# Patient Record
Sex: FEMALE | Race: WHITE | NOT HISPANIC OR LATINO | Employment: OTHER | URBAN - NONMETROPOLITAN AREA
[De-identification: names, ages, dates, MRNs, and addresses within clinical notes are randomized per-mention and may not be internally consistent; named-entity substitution may affect disease eponyms.]

---

## 2017-02-21 ENCOUNTER — TELEPHONE (OUTPATIENT)
Dept: SURGERY | Facility: CLINIC | Age: 57
End: 2017-02-21

## 2017-03-06 ENCOUNTER — TELEPHONE (OUTPATIENT)
Dept: SURGERY | Facility: CLINIC | Age: 57
End: 2017-03-06

## 2017-03-06 ENCOUNTER — OFFICE VISIT (OUTPATIENT)
Dept: SURGERY | Facility: CLINIC | Age: 57
End: 2017-03-06

## 2017-03-06 VITALS
DIASTOLIC BLOOD PRESSURE: 90 MMHG | HEIGHT: 62 IN | RESPIRATION RATE: 16 BRPM | BODY MASS INDEX: 30 KG/M2 | SYSTOLIC BLOOD PRESSURE: 158 MMHG | HEART RATE: 72 BPM | WEIGHT: 163 LBS

## 2017-03-06 DIAGNOSIS — K22.70 BARRETT'S ESOPHAGUS WITHOUT DYSPLASIA: Primary | ICD-10-CM

## 2017-03-06 DIAGNOSIS — R13.19 OTHER DYSPHAGIA: ICD-10-CM

## 2017-03-06 DIAGNOSIS — R10.13 EPIGASTRIC ABDOMINAL PAIN: ICD-10-CM

## 2017-03-06 PROCEDURE — 99214 OFFICE O/P EST MOD 30 MIN: CPT | Performed by: SURGERY

## 2017-03-06 RX ORDER — SODIUM CHLORIDE 0.9 % (FLUSH) 0.9 %
1-10 SYRINGE (ML) INJECTION AS NEEDED
Status: CANCELLED | OUTPATIENT
Start: 2017-03-06

## 2017-03-06 RX ORDER — OMEPRAZOLE 40 MG/1
40 CAPSULE, DELAYED RELEASE ORAL DAILY
COMMUNITY

## 2017-03-06 RX ORDER — DOXEPIN HYDROCHLORIDE 10 MG/1
10 CAPSULE ORAL NIGHTLY
COMMUNITY

## 2017-03-06 RX ORDER — LORATADINE 10 MG/1
CAPSULE, LIQUID FILLED ORAL
COMMUNITY

## 2017-03-06 RX ORDER — IBUPROFEN 200 MG
TABLET ORAL EVERY 6 HOURS PRN
COMMUNITY
End: 2019-11-05 | Stop reason: HOSPADM

## 2017-03-06 RX ORDER — HYDROCODONE BITARTRATE AND ACETAMINOPHEN 10; 325 MG/1; MG/1
1 TABLET ORAL EVERY 6 HOURS PRN
COMMUNITY

## 2017-03-06 RX ORDER — LIDOCAINE HYDROCHLORIDE 10 MG/ML
0.1 INJECTION, SOLUTION EPIDURAL; INFILTRATION; INTRACAUDAL; PERINEURAL ONCE AS NEEDED
Status: CANCELLED | OUTPATIENT
Start: 2017-03-06

## 2017-03-06 RX ORDER — LAMOTRIGINE 200 MG/1
200 TABLET ORAL DAILY
COMMUNITY

## 2017-03-06 RX ORDER — BUTALBITAL, ASPIRIN AND CAFFEINE 50; 325; 40 MG/1; MG/1; MG/1
1 TABLET ORAL EVERY 4 HOURS PRN
COMMUNITY
End: 2019-10-09

## 2017-03-06 RX ORDER — ARIPIPRAZOLE 2 MG/1
2 TABLET ORAL DAILY
COMMUNITY

## 2017-03-06 NOTE — H&P
Amina Petersen 56 y.o. female presents to discuss repeat EGD.  + H/O Nava's Esoph. PCP ASHWIN Verde      HPI     Above noted and agree.  Amina has been having issues with trouble swallowing and epigastric pain.  She has heartburn.  She has lost 6 pounds in the last 4 weeks.  She has constipation alternating with diarrhea.  She had a colonoscopy last year and is not due for a repeat colonoscopy until .  She denies chest pain.  She denies shortness of breath.  She has no fevers or chills.    Review of Systems   All other systems reviewed and are negative.          Past Medical History   Diagnosis Date   • Anemia    • Anxiety    • Bipolar I disorder, single manic episode    • Blocked brachial branch artery    • Bundle branch block    • Depression    • Gastric ulcer    • Hyperlipidemia    • Hypertension    • Low back pain    • Migraine headache    • Osteoarthritis    • Skin cancer            Past Surgical History   Procedure Laterality Date   •  section     • Hysterectomy     • Neuroplasty       neuroplasty median nerve at carpal tunnel   • Tonsillectomy     • Tubal abdominal ligation     • Knee arthroscopy Right    • Endoscopy N/A 3/8/2016     Procedure: ESOPHAGOGASTRODUODENOSCOPY with biopsy for kaleb and GE junction using cold forceps;  Surgeon: Cassandra Lagos DO;  Location: [facility] OR;  Service:    • Colonoscopy N/A 3/8/2016     Procedure: COLONOSCOPY with polypectomy of polyps with cold forceps;  Surgeon: Cassandra Lagos DO;  Location: [facility] OR;  Service:            Physical Exam   Constitutional: She is oriented to person, place, and time. She appears well-developed and well-nourished.   HENT:   Head: Normocephalic and atraumatic.   Neck: Neck supple.   Cardiovascular: Normal rate and regular rhythm.    Pulmonary/Chest: Effort normal and breath sounds normal.   Abdominal: Soft. Bowel sounds are normal.   Musculoskeletal: She exhibits no edema.   Neurological: She is alert and oriented to  "person, place, and time.   Skin: Skin is warm and dry.   Psychiatric: She has a normal mood and affect. Her behavior is normal.   Nursing note and vitals reviewed.          Visit Vitals   • /90   • Pulse 72   • Resp 16   • Ht 62\" (157.5 cm)   • Wt 163 lb (73.9 kg)   • BMI 29.81 kg/m2           Amina was seen today for egd.    Diagnoses and all orders for this visit:    Nava's esophagus without dysplasia    Epigastric abdominal pain    Other dysphagia    We will schedule Amina for an EGD.  I discussed with the patient the benefits and risks of performing endoscopy.  Benefits and risks were not limited to but including bleeding, infection, perforation, complications of anesthesia, aspiration.  The patient appeared to understand and is willing to proceed.    Thank you for allowing me to participate in the care of this interesting patient.        "

## 2017-03-06 NOTE — PROGRESS NOTES
Amina Petersen 56 y.o. female presents to discuss repeat EGD.  + H/O Nava's Esoph. PCP ASHWIN Verde      HPI     Above noted and agree.  Amina has been having issues with trouble swallowing and epigastric pain.  She has heartburn.  She has lost 6 pounds in the last 4 weeks.  She has constipation alternating with diarrhea.  She had a colonoscopy last year and is not due for a repeat colonoscopy until .  She denies chest pain.  She denies shortness of breath.  She has no fevers or chills.    Review of Systems   All other systems reviewed and are negative.          Past Medical History   Diagnosis Date   • Anemia    • Anxiety    • Bipolar I disorder, single manic episode    • Blocked brachial branch artery    • Bundle branch block    • Depression    • Gastric ulcer    • Hyperlipidemia    • Hypertension    • Low back pain    • Migraine headache    • Osteoarthritis    • Skin cancer            Past Surgical History   Procedure Laterality Date   •  section     • Hysterectomy     • Neuroplasty       neuroplasty median nerve at carpal tunnel   • Tonsillectomy     • Tubal abdominal ligation     • Knee arthroscopy Right    • Endoscopy N/A 3/8/2016     Procedure: ESOPHAGOGASTRODUODENOSCOPY with biopsy for kaleb and GE junction using cold forceps;  Surgeon: Cassandra Lagos DO;  Location: [facility] OR;  Service:    • Colonoscopy N/A 3/8/2016     Procedure: COLONOSCOPY with polypectomy of polyps with cold forceps;  Surgeon: Cassandra Lagos DO;  Location: [facility] OR;  Service:            Physical Exam   Constitutional: She is oriented to person, place, and time. She appears well-developed and well-nourished.   HENT:   Head: Normocephalic and atraumatic.   Neck: Neck supple.   Cardiovascular: Normal rate and regular rhythm.    Pulmonary/Chest: Effort normal and breath sounds normal.   Abdominal: Soft. Bowel sounds are normal.   Musculoskeletal: She exhibits no edema.   Neurological: She is alert and oriented to  "person, place, and time.   Skin: Skin is warm and dry.   Psychiatric: She has a normal mood and affect. Her behavior is normal.   Nursing note and vitals reviewed.          Visit Vitals   • /90   • Pulse 72   • Resp 16   • Ht 62\" (157.5 cm)   • Wt 163 lb (73.9 kg)   • BMI 29.81 kg/m2           Amina was seen today for egd.    Diagnoses and all orders for this visit:    Nava's esophagus without dysplasia    Epigastric abdominal pain    Other dysphagia    We will schedule Amina for an EGD.  I discussed with the patient the benefits and risks of performing endoscopy.  Benefits and risks were not limited to but including bleeding, infection, perforation, complications of anesthesia, aspiration.  The patient appeared to understand and is willing to proceed.    Thank you for allowing me to participate in the care of this interesting patient.      "

## 2019-10-08 ENCOUNTER — TELEPHONE (OUTPATIENT)
Dept: SURGERY | Facility: CLINIC | Age: 59
End: 2019-10-08

## 2019-10-08 NOTE — TELEPHONE ENCOUNTER
Phone call to pt   RE: repeat EGD. Discussed with pt that she is over due for repeat EGD. Pt is agreeable and sched OV 10/09/2019 to discuss.

## 2019-10-09 ENCOUNTER — OFFICE VISIT (OUTPATIENT)
Dept: SURGERY | Facility: CLINIC | Age: 59
End: 2019-10-09

## 2019-10-09 VITALS
RESPIRATION RATE: 16 BRPM | DIASTOLIC BLOOD PRESSURE: 100 MMHG | BODY MASS INDEX: 32.57 KG/M2 | HEIGHT: 62 IN | HEART RATE: 72 BPM | WEIGHT: 177 LBS | SYSTOLIC BLOOD PRESSURE: 162 MMHG

## 2019-10-09 DIAGNOSIS — K22.70 BARRETT'S ESOPHAGUS WITHOUT DYSPLASIA: Primary | ICD-10-CM

## 2019-10-09 DIAGNOSIS — R13.19 OTHER DYSPHAGIA: ICD-10-CM

## 2019-10-09 PROCEDURE — 99214 OFFICE O/P EST MOD 30 MIN: CPT | Performed by: SURGERY

## 2019-10-09 NOTE — H&P
"Amina Petersen 59 y.o. female presents to discuss over due EGD.  Pt reports she noticed a red growth in her throat since May.  Pt states she saw ENT, Dr. Roque and he told her that her tonsil grew back.  Pt also reports Dr. Roque saw something else in her throat and \"he is watching it.\"  Chief Complaint   Patient presents with   • EGD             HPI   Above-noted and agree.  Amina is due for her repeat EGD for Nava's esophagus.  Her last EGD was 3 years ago.  She still has heartburn and she occasionally has trouble swallowing bread.  She has no nausea or vomiting.  She has no abdominal pain.  She is moving her bowels well without seeing blood.  Her next colonoscopy is due in 2021.  Denies chest pain or shortness of breath.  She does not use tobacco products.  She has no other complaints.      Review of Systems   All other systems reviewed and are negative.            Current Outpatient Medications:   •  ARIPiprazole (ABILIFY) 2 MG tablet, Take 2 mg by mouth Daily., Disp: , Rfl:   •  doxepin (SINEquan) 10 MG capsule, Take 10 mg by mouth Every Night., Disp: , Rfl:   •  gabapentin (NEURONTIN) 300 MG capsule, Take 300 mg by mouth 3 (three) times a day., Disp: , Rfl:   •  HYDROcodone-acetaminophen (NORCO)  MG per tablet, Take 1 tablet by mouth Every 6 (Six) Hours As Needed for moderate pain (4-6)., Disp: , Rfl:   •  ibuprofen (ADVIL,MOTRIN) 200 MG tablet, Take  by mouth Every 6 (Six) Hours As Needed for mild pain (1-3)., Disp: , Rfl:   •  lamoTRIgine (LaMICtal) 200 MG tablet, Take 200 mg by mouth Daily., Disp: , Rfl:   •  levothyroxine (SYNTHROID, LEVOTHROID) 100 MCG tablet, Take 100 mcg by mouth daily., Disp: , Rfl:   •  Loratadine 10 MG capsule, Take  by mouth., Disp: , Rfl:   •  metoprolol tartrate (LOPRESSOR) 25 MG tablet, Take 1 tablet by mouth 2 (two) times a day., Disp: , Rfl:   •  nitroglycerin (NITROSTAT) 0.4 MG SL tablet, Place under the tongue. Place 1 tablet under the tongue every 5 minutes for up " to 3 doses as needed for chest pain. Call 911 if pain persists., Disp: , Rfl:   •  omeprazole (priLOSEC) 40 MG capsule, Take 40 mg by mouth Daily., Disp: , Rfl:   •  QUEtiapine XR (SEROquel XR) 400 MG 24 hr tablet, Take by mouth., Disp: , Rfl:   •  SUMAtriptan (IMITREX) 100 MG tablet, Take one tablet at onset of headache. May repeat dose one time in 2 hours if headache not relieved., Disp: , Rfl:   •  topiramate (TOPAMAX) 100 MG tablet, Take 1 tablet by mouth daily., Disp: , Rfl:         Allergies   Allergen Reactions   • Tetracycline    • Tetracyclines & Related            Past Medical History:   Diagnosis Date   • Anemia    • Anxiety    • Bipolar I disorder, single manic episode (CMS/HCC)    • Blocked brachial branch artery    • Bundle branch block    • Depression    • Gastric ulcer    • Hyperlipidemia    • Hypertension    • Low back pain    • Migraine headache    • Osteoarthritis    • Skin cancer            Past Surgical History:   Procedure Laterality Date   •  SECTION     • COLONOSCOPY N/A 3/8/2016    Procedure: COLONOSCOPY with polypectomy of polyps with cold forceps;  Surgeon: Cassandra Lagos DO;  Location: [facility] OR;  Service:    • ENDOSCOPY N/A 3/8/2016    Procedure: ESOPHAGOGASTRODUODENOSCOPY with biopsy for kaleb and GE junction using cold forceps;  Surgeon: Cassandra Lagos DO;  Location: [facility] OR;  Service:    • HYSTERECTOMY     • KNEE ARTHROSCOPY Right    • NEUROPLASTY      neuroplasty median nerve at carpal tunnel   • TONSILLECTOMY     • TUBAL ABDOMINAL LIGATION             Social History     Tobacco Use   • Smoking status: Never Smoker   • Smokeless tobacco: Never Used   Substance Use Topics   • Alcohol use: No   • Drug use: Not on file             There is no immunization history on file for this patient.        Physical Exam   Constitutional: She is oriented to person, place, and time. She appears well-developed and well-nourished.   HENT:   Head: Normocephalic and atraumatic.   Right  "Ear: External ear normal.   Left Ear: External ear normal.   Cardiovascular: Normal rate and regular rhythm.   Pulmonary/Chest: Effort normal and breath sounds normal.   Abdominal: Soft. Bowel sounds are normal.   Musculoskeletal: She exhibits no edema or deformity.   Neurological: She is alert and oriented to person, place, and time.   Skin: Skin is warm and dry.   Psychiatric: She has a normal mood and affect. Her behavior is normal.   Nursing note and vitals reviewed.      Debilities/Disabilities Identified: None    Emotional Behavior: Appropriate      /100   Pulse 72   Resp 16   Ht 157.5 cm (62\")   Wt 80.3 kg (177 lb)   BMI 32.37 kg/m²          Amina was seen today for egd.    Diagnoses and all orders for this visit:    Nava's esophagus without dysplasia    Other dysphagia    I discussed with the patient the benefits and risks of performing endoscopy.  Benefits and risks were not limited to but including bleeding, infection, perforation, complications of anesthesia, aspiration.  The patient appeared to understand and is willing to proceed.    Thank you for allowing me to participate in the care of this interesting patient.          "

## 2019-10-09 NOTE — PROGRESS NOTES
"Amina Petersen 59 y.o. female presents to discuss over due EGD.  Pt reports she noticed a red growth in her throat since May.  Pt states she saw ENT, Dr. Roque and he told her that her tonsil grew back.  Pt also reports Dr. Roque saw something else in her throat and \"he is watching it.\"  Chief Complaint   Patient presents with   • EGD             HPI   Above-noted and agree.  Amina is due for her repeat EGD for Nava's esophagus.  Her last EGD was 3 years ago.  She still has heartburn and she occasionally has trouble swallowing bread.  She has no nausea or vomiting.  She has no abdominal pain.  She is moving her bowels well without seeing blood.  Her next colonoscopy is due in 2021.  Denies chest pain or shortness of breath.  She does not use tobacco products.  She has no other complaints.      Review of Systems   All other systems reviewed and are negative.            Current Outpatient Medications:   •  ARIPiprazole (ABILIFY) 2 MG tablet, Take 2 mg by mouth Daily., Disp: , Rfl:   •  doxepin (SINEquan) 10 MG capsule, Take 10 mg by mouth Every Night., Disp: , Rfl:   •  gabapentin (NEURONTIN) 300 MG capsule, Take 300 mg by mouth 3 (three) times a day., Disp: , Rfl:   •  HYDROcodone-acetaminophen (NORCO)  MG per tablet, Take 1 tablet by mouth Every 6 (Six) Hours As Needed for moderate pain (4-6)., Disp: , Rfl:   •  ibuprofen (ADVIL,MOTRIN) 200 MG tablet, Take  by mouth Every 6 (Six) Hours As Needed for mild pain (1-3)., Disp: , Rfl:   •  lamoTRIgine (LaMICtal) 200 MG tablet, Take 200 mg by mouth Daily., Disp: , Rfl:   •  levothyroxine (SYNTHROID, LEVOTHROID) 100 MCG tablet, Take 100 mcg by mouth daily., Disp: , Rfl:   •  Loratadine 10 MG capsule, Take  by mouth., Disp: , Rfl:   •  metoprolol tartrate (LOPRESSOR) 25 MG tablet, Take 1 tablet by mouth 2 (two) times a day., Disp: , Rfl:   •  nitroglycerin (NITROSTAT) 0.4 MG SL tablet, Place under the tongue. Place 1 tablet under the tongue every 5 minutes for up " to 3 doses as needed for chest pain. Call 911 if pain persists., Disp: , Rfl:   •  omeprazole (priLOSEC) 40 MG capsule, Take 40 mg by mouth Daily., Disp: , Rfl:   •  QUEtiapine XR (SEROquel XR) 400 MG 24 hr tablet, Take by mouth., Disp: , Rfl:   •  SUMAtriptan (IMITREX) 100 MG tablet, Take one tablet at onset of headache. May repeat dose one time in 2 hours if headache not relieved., Disp: , Rfl:   •  topiramate (TOPAMAX) 100 MG tablet, Take 1 tablet by mouth daily., Disp: , Rfl:         Allergies   Allergen Reactions   • Tetracycline    • Tetracyclines & Related            Past Medical History:   Diagnosis Date   • Anemia    • Anxiety    • Bipolar I disorder, single manic episode (CMS/HCC)    • Blocked brachial branch artery    • Bundle branch block    • Depression    • Gastric ulcer    • Hyperlipidemia    • Hypertension    • Low back pain    • Migraine headache    • Osteoarthritis    • Skin cancer            Past Surgical History:   Procedure Laterality Date   •  SECTION     • COLONOSCOPY N/A 3/8/2016    Procedure: COLONOSCOPY with polypectomy of polyps with cold forceps;  Surgeon: Cassandra Lagos DO;  Location: [facility] OR;  Service:    • ENDOSCOPY N/A 3/8/2016    Procedure: ESOPHAGOGASTRODUODENOSCOPY with biopsy for kaleb and GE junction using cold forceps;  Surgeon: Cassandra Lagos DO;  Location: [facility] OR;  Service:    • HYSTERECTOMY     • KNEE ARTHROSCOPY Right    • NEUROPLASTY      neuroplasty median nerve at carpal tunnel   • TONSILLECTOMY     • TUBAL ABDOMINAL LIGATION             Social History     Tobacco Use   • Smoking status: Never Smoker   • Smokeless tobacco: Never Used   Substance Use Topics   • Alcohol use: No   • Drug use: Not on file             There is no immunization history on file for this patient.        Physical Exam   Constitutional: She is oriented to person, place, and time. She appears well-developed and well-nourished.   HENT:   Head: Normocephalic and atraumatic.   Right  "Ear: External ear normal.   Left Ear: External ear normal.   Cardiovascular: Normal rate and regular rhythm.   Pulmonary/Chest: Effort normal and breath sounds normal.   Abdominal: Soft. Bowel sounds are normal.   Musculoskeletal: She exhibits no edema or deformity.   Neurological: She is alert and oriented to person, place, and time.   Skin: Skin is warm and dry.   Psychiatric: She has a normal mood and affect. Her behavior is normal.   Nursing note and vitals reviewed.      Debilities/Disabilities Identified: None    Emotional Behavior: Appropriate      /100   Pulse 72   Resp 16   Ht 157.5 cm (62\")   Wt 80.3 kg (177 lb)   BMI 32.37 kg/m²         Amina was seen today for egd.    Diagnoses and all orders for this visit:    Nava's esophagus without dysplasia    Other dysphagia    I discussed with the patient the benefits and risks of performing endoscopy.  Benefits and risks were not limited to but including bleeding, infection, perforation, complications of anesthesia, aspiration.  The patient appeared to understand and is willing to proceed.    Thank you for allowing me to participate in the care of this interesting patient.        "

## 2019-11-04 ENCOUNTER — ANESTHESIA EVENT (OUTPATIENT)
Dept: PERIOP | Facility: HOSPITAL | Age: 59
End: 2019-11-04

## 2019-11-05 ENCOUNTER — HOSPITAL ENCOUNTER (OUTPATIENT)
Facility: HOSPITAL | Age: 59
Setting detail: HOSPITAL OUTPATIENT SURGERY
Discharge: HOME | End: 2019-11-05
Attending: SURGERY | Admitting: SURGERY

## 2019-11-05 ENCOUNTER — ANESTHESIA (OUTPATIENT)
Dept: PERIOP | Facility: HOSPITAL | Age: 59
End: 2019-11-05

## 2019-11-05 VITALS
DIASTOLIC BLOOD PRESSURE: 89 MMHG | RESPIRATION RATE: 15 BRPM | BODY MASS INDEX: 32.83 KG/M2 | HEART RATE: 73 BPM | OXYGEN SATURATION: 98 % | TEMPERATURE: 97.9 F | HEIGHT: 62 IN | WEIGHT: 178.4 LBS | SYSTOLIC BLOOD PRESSURE: 171 MMHG

## 2019-11-05 DIAGNOSIS — K22.70 BARRETT'S ESOPHAGUS WITHOUT DYSPLASIA: ICD-10-CM

## 2019-11-05 DIAGNOSIS — R13.19 OTHER DYSPHAGIA: ICD-10-CM

## 2019-11-05 PROCEDURE — 43239 EGD BIOPSY SINGLE/MULTIPLE: CPT | Performed by: SURGERY

## 2019-11-05 PROCEDURE — 87081 CULTURE SCREEN ONLY: CPT | Performed by: SURGERY

## 2019-11-05 PROCEDURE — 25010000003 LIDOCAINE 1 % SOLUTION: Performed by: NURSE ANESTHETIST, CERTIFIED REGISTERED

## 2019-11-05 PROCEDURE — 88305 TISSUE EXAM BY PATHOLOGIST: CPT | Performed by: SURGERY

## 2019-11-05 PROCEDURE — 93005 ELECTROCARDIOGRAM TRACING: CPT | Performed by: NURSE ANESTHETIST, CERTIFIED REGISTERED

## 2019-11-05 PROCEDURE — 25010000002 PROPOFOL 10 MG/ML EMULSION: Performed by: NURSE ANESTHETIST, CERTIFIED REGISTERED

## 2019-11-05 PROCEDURE — 93010 ELECTROCARDIOGRAM REPORT: CPT | Performed by: INTERNAL MEDICINE

## 2019-11-05 RX ORDER — PROPOFOL 10 MG/ML
VIAL (ML) INTRAVENOUS CONTINUOUS PRN
Status: DISCONTINUED | OUTPATIENT
Start: 2019-11-05 | End: 2019-11-05 | Stop reason: SURG

## 2019-11-05 RX ORDER — LIDOCAINE HYDROCHLORIDE 10 MG/ML
INJECTION, SOLUTION INFILTRATION; PERINEURAL AS NEEDED
Status: DISCONTINUED | OUTPATIENT
Start: 2019-11-05 | End: 2019-11-05 | Stop reason: SURG

## 2019-11-05 RX ORDER — GLYCOPYRROLATE 0.2 MG/ML
INJECTION INTRAMUSCULAR; INTRAVENOUS AS NEEDED
Status: DISCONTINUED | OUTPATIENT
Start: 2019-11-05 | End: 2019-11-05 | Stop reason: SURG

## 2019-11-05 RX ORDER — SUCRALFATE 1 G/1
1 TABLET ORAL 4 TIMES DAILY
Qty: 120 TABLET | Refills: 1 | Status: SHIPPED | OUTPATIENT
Start: 2019-11-05 | End: 2020-11-04

## 2019-11-05 RX ORDER — SODIUM CHLORIDE 0.9 % (FLUSH) 0.9 %
1-10 SYRINGE (ML) INJECTION AS NEEDED
Status: DISCONTINUED | OUTPATIENT
Start: 2019-11-05 | End: 2019-11-05 | Stop reason: HOSPADM

## 2019-11-05 RX ORDER — SODIUM CHLORIDE, SODIUM LACTATE, POTASSIUM CHLORIDE, CALCIUM CHLORIDE 600; 310; 30; 20 MG/100ML; MG/100ML; MG/100ML; MG/100ML
9 INJECTION, SOLUTION INTRAVENOUS CONTINUOUS
Status: DISCONTINUED | OUTPATIENT
Start: 2019-11-05 | End: 2019-11-05 | Stop reason: HOSPADM

## 2019-11-05 RX ORDER — LIDOCAINE HYDROCHLORIDE 10 MG/ML
0.5 INJECTION, SOLUTION EPIDURAL; INFILTRATION; INTRACAUDAL; PERINEURAL ONCE AS NEEDED
Status: COMPLETED | OUTPATIENT
Start: 2019-11-05 | End: 2019-11-05

## 2019-11-05 RX ORDER — PROPOFOL 10 MG/ML
VIAL (ML) INTRAVENOUS AS NEEDED
Status: DISCONTINUED | OUTPATIENT
Start: 2019-11-05 | End: 2019-11-05 | Stop reason: SURG

## 2019-11-05 RX ORDER — SODIUM CHLORIDE 0.9 % (FLUSH) 0.9 %
3 SYRINGE (ML) INJECTION EVERY 12 HOURS SCHEDULED
Status: DISCONTINUED | OUTPATIENT
Start: 2019-11-05 | End: 2019-11-05 | Stop reason: HOSPADM

## 2019-11-05 RX ORDER — MAGNESIUM HYDROXIDE 1200 MG/15ML
LIQUID ORAL AS NEEDED
Status: DISCONTINUED | OUTPATIENT
Start: 2019-11-05 | End: 2019-11-05 | Stop reason: HOSPADM

## 2019-11-05 RX ORDER — SODIUM CHLORIDE 9 MG/ML
40 INJECTION, SOLUTION INTRAVENOUS AS NEEDED
Status: DISCONTINUED | OUTPATIENT
Start: 2019-11-05 | End: 2019-11-05 | Stop reason: HOSPADM

## 2019-11-05 RX ADMIN — SODIUM CHLORIDE, POTASSIUM CHLORIDE, SODIUM LACTATE AND CALCIUM CHLORIDE: 600; 310; 30; 20 INJECTION, SOLUTION INTRAVENOUS at 10:44

## 2019-11-05 RX ADMIN — PROPOFOL 50 MG: 10 INJECTION, EMULSION INTRAVENOUS at 10:50

## 2019-11-05 RX ADMIN — GLYCOPYRROLATE 0.1 MG: 0.2 INJECTION INTRAMUSCULAR; INTRAVENOUS at 10:45

## 2019-11-05 RX ADMIN — PROPOFOL 50 MG: 10 INJECTION, EMULSION INTRAVENOUS at 10:54

## 2019-11-05 RX ADMIN — PROPOFOL 50 MG: 10 INJECTION, EMULSION INTRAVENOUS at 10:46

## 2019-11-05 RX ADMIN — LIDOCAINE HYDROCHLORIDE 0.5 ML: 10 INJECTION, SOLUTION EPIDURAL; INFILTRATION; INTRACAUDAL; PERINEURAL at 09:50

## 2019-11-05 RX ADMIN — SODIUM CHLORIDE, POTASSIUM CHLORIDE, SODIUM LACTATE AND CALCIUM CHLORIDE 9 ML/HR: 600; 310; 30; 20 INJECTION, SOLUTION INTRAVENOUS at 09:50

## 2019-11-05 RX ADMIN — PROPOFOL 100 MCG/KG/MIN: 10 INJECTION, EMULSION INTRAVENOUS at 10:46

## 2019-11-05 RX ADMIN — LIDOCAINE HYDROCHLORIDE 50 MG: 10 INJECTION, SOLUTION INFILTRATION; PERINEURAL at 10:45

## 2019-11-05 NOTE — OP NOTE
EGD Procedure Note    Pre-operative Diagnosis: Nava's esophagus, dysphasia    Post-operative Diagnosis: Nava's esophagus and duodenal ulcer    Procedure:  EGD with biopsies with cold forceps and hemostasis with heater probe    Surgeon: Yolis    Estimated Blood Loss: Minimal    Complications: None    Anesthetic: MAC    Indications: Nava's esophagus and dysphasia    Findings/Treatments:    Nava's esophagus-biopsies with cold forceps  Duodenal ulcer-biopsy for H. pylori with cold forceps and biopsy of duodenal ulcer with cold forceps with coagulation using heater probe    Recommendations:  -Await pathology.      Technique:    After discussing the benefits and risks of an EGD, benefits and risks not limited to but including:  Bleeding, infection, perforation, aspiration; informed consent was signed.  The patient was taken into the endoscopy suite at [facility] and placed in the left lateral decubitus position.  MAC anesthesia was induced under appropriate monitoring.  Bite block was placed and the gastroscope was inserted thru such and advanced under direct vision to second portion of the duodenum.  A careful inspection was made as the gastroscope was withdrawn, including a retroflexed view of the proximal stomach; findings and interventions are described below.  If biopsies were taken, this was done with the cold biopsy forceps.  The duodenal ulcer that was biopsied did bleed we therefore obtained hemostasis using the heater probe.  The heater probe was inserted into the ulcer and the pedal was stopped on releasing coagulation.  The probe was then removed and hemostasis was noted.    Cassandra Lagos D.O.

## 2019-11-05 NOTE — ANESTHESIA POSTPROCEDURE EVALUATION
Patient: Amina Petersen    Procedure Summary     Date:  11/05/19 Room / Location:  [facility] ENDOSCOPY 1 /  [facility] OR    Anesthesia Start:  1044 Anesthesia Stop:  1103    Procedure:  ESOPHAGOGASTRODUODENOSCOPY with biopsy (N/A Esophagus) Diagnosis:       Nava's esophagus without dysplasia      Other dysphagia      (Nava's esophagus without dysplasia [K22.70])      (Other dysphagia [R13.19])    Surgeon:  Cassandra Lagos DO Provider:  Anjel Castro CRNA    Anesthesia Type:  MAC ASA Status:  3          Anesthesia Type: MAC  Last vitals  BP   140/87 (11/05/19 1112)   Temp   97.9 °F (36.6 °C) (11/05/19 0940)   Pulse   73 (11/05/19 1112)   Resp   12 (11/05/19 1112)     SpO2   98 % (11/05/19 1112)     Post Anesthesia Care and Evaluation    Patient location during evaluation: bedside  Patient participation: complete - patient participated  Level of consciousness: awake and alert  Pain score: 0  Pain management: adequate  Airway patency: patent  Anesthetic complications: No anesthetic complications  PONV Status: none  Cardiovascular status: acceptable  Respiratory status: acceptable  Hydration status: acceptable

## 2019-11-05 NOTE — ANESTHESIA PREPROCEDURE EVALUATION
Anesthesia Evaluation     Patient summary reviewed and Nursing notes reviewed   no history of anesthetic complications:  NPO Solid Status: > 8 hours  NPO Liquid Status: > 8 hours           Airway   Mallampati: II  TM distance: >3 FB  Neck ROM: full  No difficulty expected  Dental - normal exam     Pulmonary - normal exam    breath sounds clear to auscultation  (-) not a smokerSleep apnea: snores.  Cardiovascular - normal exam  Exercise tolerance: good (4-7 METS)    ECG reviewed  Patient on routine beta blocker and Beta blocker given within 24 hours of surgery  Rhythm: regular  Rate: normal    (+) hypertension poorly controlled, dysrhythmias (bundle branch block), hyperlipidemia,     ROS comment: LBBB on EKG.  History of abnormal EKG, none available for comparison.  Normal cardiac workup 2012.    Neuro/Psych  (+) psychiatric history Anxiety, Depression and Bipolar,     Headaches: Migraines.  GI/Hepatic/Renal/Endo    (+) obesity,  GERD well controlled,  thyroid problem hypothyroidism    Musculoskeletal     (+) back pain, radiculopathy Right lower extremity  Myalgias: muscle aches.  Abdominal    Substance History - negative use     OB/GYN negative ob/gyn ROS         Other   arthritis (knees),                    Anesthesia Plan    ASA 3     MAC     intravenous induction     Anesthetic plan, all risks, benefits, and alternatives have been provided, discussed and informed consent has been obtained with: patient.

## 2019-11-05 NOTE — INTERVAL H&P NOTE
"  H&P reviewed. The patient was examined and there are no changes to the H&P.       /91 (BP Location: Left arm, Patient Position: Lying)   Pulse 75   Temp 97.9 °F (36.6 °C) (Oral)   Resp 14   Ht 157.5 cm (62\")   Wt 80.9 kg (178 lb 6.4 oz)   SpO2 98%   BMI 32.63 kg/m²         "

## 2019-11-06 LAB
CYTO UR: NORMAL
LAB AP CASE REPORT: NORMAL
LAB AP DIAGNOSIS COMMENT: NORMAL
PATH REPORT.FINAL DX SPEC: NORMAL
PATH REPORT.GROSS SPEC: NORMAL
UREASE TISS QL: POSITIVE

## 2019-11-06 NOTE — PROGRESS NOTES
Please call in the appropriate antibiotics, have her hold the carafate and increase the prilosec to bid.

## 2019-11-13 ENCOUNTER — OFFICE VISIT (OUTPATIENT)
Dept: SURGERY | Facility: CLINIC | Age: 59
End: 2019-11-13

## 2019-11-13 DIAGNOSIS — K26.9 DUODENAL ULCER: Primary | ICD-10-CM

## 2019-11-13 DIAGNOSIS — A04.8 H. PYLORI INFECTION: ICD-10-CM

## 2019-11-13 PROCEDURE — 99213 OFFICE O/P EST LOW 20 MIN: CPT | Performed by: SURGERY

## 2019-11-13 NOTE — PROGRESS NOTES
Amina Petersen 59 y.o. female presents for PO FU EGD/+HPylori.  Discussed Rx with pt.  Pt verbalizes understanding.       HPI   Above noted and agree.  Amina had a duodenal ulcer and H. Pylori.  She does not use tobacco products.  She drinks soda.  She says her throat is sore.  It doesn't hurt it is just sore.  She has no fevers or chills.  She has no other complaints.      Review of Systems        Past Medical History:   Diagnosis Date   • Anemia    • Anxiety    • Bipolar I disorder, single manic episode (CMS/HCC)    • Blocked brachial branch artery    • Bundle branch block    • Depression    • Gastric ulcer    • Hyperlipidemia    • Hypertension    • Low back pain    • Migraine headache    • Osteoarthritis    • Skin cancer            Past Surgical History:   Procedure Laterality Date   • BREAST LUMPECTOMY Left    •  SECTION     • COLONOSCOPY N/A 3/8/2016    Procedure: COLONOSCOPY with polypectomy of polyps with cold forceps;  Surgeon: Cassandra Lagos DO;  Location: [facility] OR;  Service:    • ENDOSCOPY N/A 3/8/2016    Procedure: ESOPHAGOGASTRODUODENOSCOPY with biopsy for kaleb and GE junction using cold forceps;  Surgeon: Cassandra Lagos DO;  Location: [facility] OR;  Service:    • ENDOSCOPY N/A 2019    Procedure: ESOPHAGOGASTRODUODENOSCOPY with biopsy;  Surgeon: Cassandra Lagos DO;  Location: [facility] OR;  Service: Gastroenterology   • HYSTERECTOMY     • KNEE ARTHROSCOPY Right    • NEUROPLASTY      neuroplasty median nerve at carpal tunnel   • TONSILLECTOMY     • TUBAL ABDOMINAL LIGATION             Physical Exam   Constitutional: She is oriented to person, place, and time. She appears well-developed and well-nourished.   Cardiovascular: Normal rate and regular rhythm.   Pulmonary/Chest: Effort normal and breath sounds normal.   Abdominal: Soft. Bowel sounds are normal.   Musculoskeletal: She exhibits no edema or deformity.   Neurological: She is alert and oriented to person, place, and time.    Skin: Skin is warm and dry.   Psychiatric: She has a normal mood and affect. Her behavior is normal.           There were no vitals taken for this visit.        Amina was seen today for post-op follow-up.    Diagnoses and all orders for this visit:    Duodenal ulcer    H. pylori infection    We will call in the treatment for her H. Pylori.  We discussed dietary changes.  She may follow up anytime as needed.    Thank you for allowing me to participate in the care of this interesting patient.

## 2019-11-19 ENCOUNTER — TELEPHONE (OUTPATIENT)
Dept: SURGERY | Facility: CLINIC | Age: 59
End: 2019-11-19

## 2019-11-19 NOTE — TELEPHONE ENCOUNTER
Amina called the office this morning with c/o nausea from the antibiotics she is taking to treat the H-Pylori.  She ask if Dr. Lagos could possibly call in Zofran or Phenergan.      I spoke with Dr. Lagos about this and she had me call in Zofran 4mg #30 1q 6hrs as needed no refills.  This was called to Brennon(pharmacist) at [facility].  Amina is aware that the medicine has been called in for her.

## 2020-05-26 RX ORDER — SUCRALFATE 1 G/1
TABLET ORAL
Qty: 120 TABLET | Refills: 1 | OUTPATIENT
Start: 2020-05-26

## 2021-03-09 ENCOUNTER — TELEPHONE (OUTPATIENT)
Dept: SURGERY | Facility: CLINIC | Age: 61
End: 2021-03-09

## (undated) DEVICE — BW-412T DISP COMBO CLEANING BRUSH: Brand: SINGLE USE COMBINATION CLEANING BRUSH

## (undated) DEVICE — SYR LL 3CC

## (undated) DEVICE — LAB CORP AGAR SLANT UREA PK/10

## (undated) DEVICE — FRCP BX RADJAW4 NDL 2.8 240CM LG OG BX40

## (undated) DEVICE — Device

## (undated) DEVICE — Device: Brand: DEFENDO AIR/WATER/SUCTION AND BIOPSY VALVE

## (undated) DEVICE — GOWN ISOL W/THUMB UNIV BLU BX/15

## (undated) DEVICE — MASK,FACE,FLUID RESIST,SHLD,EARLOOP: Brand: MEDLINE

## (undated) DEVICE — THE BITE BLOCK MAXI, LATEX FREE STRAP IS USED TO PROTECT THE ENDOSCOPE INSERTION TUBE FROM BEING BITTEN BY THE PATIENT.

## (undated) DEVICE — SUCTION CANISTER, 3000CC,SAFELINER: Brand: DEROYAL

## (undated) DEVICE — SPNG GZ WOVN 4X4IN 12PLY 10/BX STRL